# Patient Record
Sex: FEMALE | Race: WHITE | NOT HISPANIC OR LATINO | Employment: FULL TIME | URBAN - METROPOLITAN AREA
[De-identification: names, ages, dates, MRNs, and addresses within clinical notes are randomized per-mention and may not be internally consistent; named-entity substitution may affect disease eponyms.]

---

## 2017-02-17 ENCOUNTER — OFFICE VISIT (OUTPATIENT)
Dept: OPHTHALMOLOGY | Facility: CLINIC | Age: 42
End: 2017-02-17

## 2017-02-17 DIAGNOSIS — H52.13 MYOPIA, BILATERAL: Primary | ICD-10-CM

## 2017-02-17 ASSESSMENT — REFRACTION_CURRENTRX
OD_BRAND: DAILIES TOTAL 1
OS_BASECURVE: 8.5
OD_BASECURVE: 8.5
OS_BRAND: DAILIES TOTAL 1
OD_SPHERE: -1.25
OD_DIAMETER: 14.1
OS_BASECURVE: 8.5
OS_DIAMETER: 14.1
OS_SPHERE: -1.25
OS_BRAND: DAILIES TOTAL 1
OS_SPHERE: -1.25
OS_DIAMETER: 14.1

## 2017-02-17 ASSESSMENT — REFRACTION_WEARINGRX
OS_AXIS: 107
OD_AXIS: 085
OS_SPHERE: -1.75
OS_CYLINDER: +0.75
OD_CYLINDER: +0.50
OD_SPHERE: -1.50

## 2017-02-17 ASSESSMENT — EXTERNAL EXAM - LEFT EYE: OS_EXAM: NORMAL

## 2017-02-17 ASSESSMENT — CONF VISUAL FIELD
METHOD: COUNTING FINGERS
OD_NORMAL: 1
OS_NORMAL: 1

## 2017-02-17 ASSESSMENT — TONOMETRY
OD_IOP_MMHG: 10
OS_IOP_MMHG: 10
IOP_METHOD: ICARE

## 2017-02-17 ASSESSMENT — VISUAL ACUITY
CORRECTION_TYPE: GLASSES
OS_CC: J1+
OD_CC: J1+
METHOD: SNELLEN - LINEAR
OS_CC: 20/20
OD_CC: 20/20
METHOD_MR: LEFT EYE DOMINANT

## 2017-02-17 ASSESSMENT — REFRACTION_MANIFEST
OD_ADD: +1.25
OD_AXIS: 094
OD_CYLINDER: +0.25
OS_AXIS: 113
OS_SPHERE: -1.25
OD_SPHERE: -1.25
OS_CYLINDER: +0.75
OS_ADD: +1.25

## 2017-02-17 ASSESSMENT — EXTERNAL EXAM - RIGHT EYE: OD_EXAM: NORMAL

## 2017-02-17 ASSESSMENT — CUP TO DISC RATIO
OD_RATIO: 0.3
OS_RATIO: 0.25

## 2017-02-17 ASSESSMENT — SLIT LAMP EXAM - LIDS
COMMENTS: NORMAL
COMMENTS: NORMAL

## 2017-02-17 NOTE — NURSING NOTE
Chief Complaints and History of Present Illnesses   Patient presents with     Annual Eye Exam     Contact Lens Evaluation     aware of fee     HPI    Affected eye(s):  Both   Symptoms:     No blurred vision   No decreased vision      Frequency:  Constant       Do you have eye pain now?:  No      Comments:  Pt states glasses work well. Pt states unhappy with contacts because noticing the near vision is getting really difficult. No pain. Visine PRN OU.    Aristides Meneses COT 8:02 AM February 17, 2017

## 2017-02-17 NOTE — MR AVS SNAPSHOT
After Visit Summary   2/17/2017    Temi Truong    MRN: [id number]           Patient Information     Date Of Birth          1975        Visit Information        Provider Department      2/17/2017 8:00 AM Terence Bass OD [facility]        Today's Diagnoses     Myopia, bilateral    -  1       Follow-ups after your visit        Follow-up notes from your care team     Return in about 1 week (around 2/24/2017).      Your next 10 appointments already scheduled     Feb 28, 2017  3:40 PM CST   (Arrive by 3:25 PM)   RETURN GENERAL [facility] ([facility] [place])    [address]  [address] [facility] [address] [phone]              Who to contact     Please call your clinic at [phone] to:    Ask questions about your health    Make or cancel appointments    Discuss your medicines    Learn about your test results    Speak to your doctor   If you have compliments or concerns about an experience at your clinic, or if you wish to file a complaint, please contact [facility] Patient Relations at [phone] or email us at [email or web address]         Additional Information About Your Visit        MyChart Information     Smart Ecosystem[employer] gives you secure access to your electronic health record. If you see a primary care provider, you can also send messages to your care team and make appointments. If you have questions, please call your primary care clinic.  If you do not have a primary care provider, please call [phone] and they will assist you.      AppNexus is an electronic gateway that provides easy, online access to your medical records. With App[employer]ex[employer], you can request a clinic appointment, read your test results, renew a prescription or communicate with your care team.     To access your existing account, please contact your [facility] or call  [phone] for assistance.        Care EveryWhere ID     This is your Care EveryWhere ID. This could be used by other organizations to access your [place] medical records  [id number]         Blood Pressure from Last 3 Encounters:   10/31/16 116/76    Weight from Last 3 Encounters:   10/31/16 81.2 kg (179 lb)              We Performed the Following     HC CONTACT LENS FITTING COSMETIC LVL 2 - PEDS (27299.012)     Refraction        Primary Care Provider Office Phone # Fax #    Zenaida Harriett Cooper [phone]       [facility] [address]  [facility] [address]        Thank you!     Thank you for choosing [facility] OPHTHALMOLOGY  for your care. Our goal is always to provide you with excellent care. Hearing back from our patients is one way we can continue to improve our services. Please take a few minutes to complete the written survey that you may receive in the mail after your visit with us. Thank you!             Your Updated Medication List - Protect others around you: Learn how to safely use, store and throw away your medicines at www.disposemymeds.org.          This list is accurate as of: 2/17/17 11:49 AM.  Always use your most recent med list.                   Brand Name Dispense Instructions for use    levothyroxine 137 MCG tablet    SYNTHROID/LEVOTHROID    90 tablet    Take 1 tablet (137 mcg) by mouth daily

## 2017-02-17 NOTE — PROGRESS NOTES
Assessment/Plan  (H52.13) Myopia, bilateral  (primary encounter diagnosis)  Comment: Compound myopic astigmatism with presbyopia OU  Plan: HC CONTACT LENS FITTING COSMETIC LVL 2 - PEDS         (50409.012), Refraction        Educated patient on condition and clinical findings. Dispensed spectacle prescription for full time wear. Educated patient on possibility of adaptation period, if symptoms do not improve return to clinic for further testing. Dispensed trial lenses OS for monovision correction. Patient to return in 1-2 weeks for contact lens follow-up and dilation.    Contact Lens Billing  V-Code:  - Soft spherical     CL fitting fee: $100 (Patient to be billed).    These are for cosmetic contact lenses.    Encounter Diagnosis   Name Primary?     Myopia, bilateral Yes        Date of last eye exam: 2/17/17        Complete documentation of historical and exam elements from today's encounter can  be found in the full encounter summary report (not reduplicated in this progress  note). I personally obtained the chief complaint(s) and history of present illness. I  confirmed and edited as necessary the review of systems, past medical/surgical  history, family history, social history, and examination findings as documented by  others; and I examined the patient myself. I personally reviewed the relevant tests,  images, and reports as documented above. I formulated and edited as necessary the  assessment and plan and discussed the findings and management plan with the  patient and family.    Terence Bass OD, FAAO

## 2017-02-28 ENCOUNTER — OFFICE VISIT (OUTPATIENT)
Dept: OPHTHALMOLOGY | Facility: CLINIC | Age: 42
End: 2017-02-28

## 2017-02-28 DIAGNOSIS — H52.13 MYOPIA, BILATERAL: Primary | ICD-10-CM

## 2017-02-28 ASSESSMENT — CUP TO DISC RATIO
OS_RATIO: 0.25
OD_RATIO: 0.3

## 2017-02-28 ASSESSMENT — REFRACTION_CURRENTRX
OD_DIAMETER: 14.2
OD_BRAND: ALCON
OS_SPHERE: -1.00
OS_BRAND: ALCON
OS_SPHERE: PLANO
OS_ADD: LOW
OD_SPHERE: -1.25
OD_BASECURVE: 8.6
OS_BRAND: ALCON
OS_BASECURVE: 8.6
OD_ADD: LOW
OS_BASECURVE: 8.6
OS_DIAMETER: 14.2
OS_ADD: LOW
OS_DIAMETER: 14.2

## 2017-02-28 ASSESSMENT — CONF VISUAL FIELD
OD_NORMAL: 1
METHOD: COUNTING FINGERS
OS_NORMAL: 1

## 2017-02-28 ASSESSMENT — SLIT LAMP EXAM - LIDS
COMMENTS: NORMAL
COMMENTS: NORMAL

## 2017-02-28 ASSESSMENT — VISUAL ACUITY
OS_CC: 20/20
OD_CC: 20/20
CORRECTION_TYPE: GLASSES
METHOD: SNELLEN - LINEAR

## 2017-02-28 ASSESSMENT — EXTERNAL EXAM - LEFT EYE: OS_EXAM: NORMAL

## 2017-02-28 ASSESSMENT — TONOMETRY
IOP_METHOD: ICARE
OS_IOP_MMHG: 11
OD_IOP_MMHG: 10

## 2017-02-28 ASSESSMENT — EXTERNAL EXAM - RIGHT EYE: OD_EXAM: NORMAL

## 2017-02-28 NOTE — NURSING NOTE
Chief Complaints and History of Present Illnesses   Patient presents with     Follow Up For     CL recheck and dilation     HPI    Affected eye(s):  Both   Symptoms:     No blurred vision      Frequency:  Constant       Do you have eye pain now?:  No      Comments:  Patient not wearing contacts today- unable to adjust to monovision lenses. Unable to read or do computer work. Pt would like something that would work for both distance and near, especially able to use it for work.     Aristides LIRA 3:47 PM February 28, 2017

## 2017-02-28 NOTE — PROGRESS NOTES
Assessment/Plan  (H52.13) Myopia, bilateral  (primary encounter diagnosis)  Comment: First time multifocal contact lens wearer  Plan: Educated patient on clinical findings. Dispensed multifocal trial lenses. Left lens had significant over-refraction in office, recheck at follow-up. Patient reported significantly improved vision OU compared to monovision. Return to clinic in 2 weeks for follow-up examination.     Ocular health otherwise unremarkable.    This visit billed as no charge contact lens follow-up.    Complete documentation of historical and exam elements from today's encounter can  be found in the full encounter summary report (not reduplicated in this progress  note). I personally obtained the chief complaint(s) and history of present illness. I  confirmed and edited as necessary the review of systems, past medical/surgical  history, family history, social history, and examination findings as documented by  others; and I examined the patient myself. I personally reviewed the relevant tests,  images, and reports as documented above. I formulated and edited as necessary the  assessment and plan and discussed the findings and management plan with the  patient and family.    Terence Bass, RAVEN, FAAO

## 2017-02-28 NOTE — MR AVS SNAPSHOT
After Visit Summary   2/28/2017    Temi Truong    MRN: [id number]           Patient Information     Date Of Birth          1975        Visit Information        Provider Department      2/28/2017 3:40 PM Terence Bass, RAVEN [facility]        Today's Diagnoses     Myopia, bilateral    -  1       Follow-ups after your visit        Follow-up notes from your care team     Return in about 2 weeks (around 3/14/2017) for Follow Up.      Who to contact     Please call your clinic at [phone] to:    Ask questions about your health    Make or cancel appointments    Discuss your medicines    Learn about your test results    Speak to your doctor   If you have compliments or concerns about an experience at your clinic, or if you wish to file a complaint, please contact [facility] Patient Relations at [phone] or email us at [email or web address]         Additional Information About Your Visit        MyChart Information     SCI Solutiont gives you secure access to your electronic health record. If you see a primary care provider, you can also send messages to your care team and make appointments. If you have questions, please call your primary care clinic.  If you do not have a primary care provider, please call [phone] and they will assist you.      [employer] Exchange is an electronic gateway that provides easy, online access to your medical records. With [employer], you can request a clinic appointment, read your test results, renew a prescription or communicate with your care team.     To access your existing account, please contact your [facility] Clinic or call [phone] for assistance.        Care EveryWhere ID     This is your Care EveryWhere ID. This could be used by other organizations to access your [place] medical records  [id number]         Blood Pressure from Last 3 Encounters:   10/31/16 116/76    Weight from Last 3  Encounters:   10/31/16 81.2 kg (179 lb)              Today, you had the following     No orders found for display       Primary Care Provider Office Phone # Fax #    Zenaida Harriett Cooper [phone]       [address]  [facility] [address]        Thank you!     Thank you for choosing [facility] OPHTHALMOLOGY  for your care. Our goal is always to provide you with excellent care. Hearing back from our patients is one way we can continue to improve our services. Please take a few minutes to complete the written survey that you may receive in the mail after your visit with us. Thank you!             Your Updated Medication List - Protect others around you: Learn how to safely use, store and throw away your medicines at www.disposemymeds.org.          This list is accurate as of: 2/28/17 11:59 PM.  Always use your most recent med list.                   Brand Name Dispense Instructions for use    levothyroxine 137 MCG tablet    SYNTHROID/LEVOTHROID    90 tablet    Take 1 tablet (137 mcg) by mouth daily

## 2017-03-27 ENCOUNTER — MYC MEDICAL ADVICE (OUTPATIENT)
Dept: INTERNAL MEDICINE | Facility: CLINIC | Age: 42
End: 2017-03-27

## 2017-03-27 DIAGNOSIS — L65.9 HAIR LOSS: ICD-10-CM

## 2017-03-27 DIAGNOSIS — L60.8 CHANGE IN NAIL APPEARANCE: Primary | ICD-10-CM

## 2017-03-29 DIAGNOSIS — L65.9 HAIR LOSS: ICD-10-CM

## 2017-03-29 DIAGNOSIS — L60.8 CHANGE IN NAIL APPEARANCE: ICD-10-CM

## 2017-03-29 LAB
DEPRECATED CALCIDIOL+CALCIFEROL SERPL-MC: 22 UG/L (ref 20–75)
FERRITIN SERPL-MCNC: 47 NG/ML (ref 12–150)
TSH SERPL DL<=0.005 MIU/L-ACNC: 3.61 MU/L (ref 0.4–4)

## 2017-04-11 ENCOUNTER — OFFICE VISIT (OUTPATIENT)
Dept: OPHTHALMOLOGY | Facility: CLINIC | Age: 42
End: 2017-04-11

## 2017-04-11 DIAGNOSIS — H52.13 MYOPIA, BILATERAL: Primary | ICD-10-CM

## 2017-04-11 ASSESSMENT — REFRACTION_CURRENTRX
OD_BASECURVE: 8.6
OD_SPHERE: -1.25
OS_DIAMETER: 14.2
OD_DIAMETER: 14.2
OD_BRAND: ALCON
OD_ADD: LOW
OS_SPHERE: -1.00
OS_BRAND: ALCON
OD_BRAND: ALCON
OD_BASECURVE: 8.6
OS_ADD: LOW
OD_DIAMETER: 14.2
OS_BRAND: ALCON
OS_SPHERE: PLANO
OS_ADD: LOW
OD_SPHERE: -1.25
OS_BASECURVE: 8.6
OD_ADD: LOW
OS_BASECURVE: 8.6
OS_DIAMETER: 14.2

## 2017-04-11 ASSESSMENT — VISUAL ACUITY
OS_CC: 20/50
CORRECTION_TYPE: CONTACTS
OS_CC: J1+OU
OD_CC: 20/20
METHOD: SNELLEN - LINEAR
OD_CC: J1+OU

## 2017-04-11 ASSESSMENT — TONOMETRY
IOP_METHOD: ICARE
OS_IOP_MMHG: 12
OD_IOP_MMHG: 12

## 2017-04-11 NOTE — NURSING NOTE
Chief Complaints and History of Present Illnesses   Patient presents with     Contact Lens Follow Up     HPI    Affected eye(s):  Both   Symptoms:     No blurred vision      Frequency:  Constant       Do you have eye pain now?:  No      Comments:  Pt states contacts are ok. Near vision is great. Distance vision is not perfect but doable. Pt states able to wear contacts all day and able to see computer and close up work- so happy with contacts.    Aristides LIRA 4:09 PM April 11, 2017

## 2017-04-11 NOTE — MR AVS SNAPSHOT
After Visit Summary   4/11/2017    Temi Truong    MRN: [id number]           Patient Information     Date Of Birth          1975        Visit Information        Provider Department      4/11/2017 4:00 PM Terence Bass OD [facility]         Follow-ups after your visit        Your next 10 appointments already scheduled     Apr 25, 2017  3:40 PM CDT   (Arrive by 3:25 PM)   RETURN GENERAL with RAVEN [facility] ([facility] [place])    [address] [place] [address] [phone]              Who to contact     Please call your clinic at [phone] to:    Ask questions about your health    Make or cancel appointments    Discuss your medicines    Learn about your test results    Speak to your doctor   If you have compliments or concerns about an experience at your clinic, or if you wish to file a complaint, please contact [facility] Patient Relations at [phone] or email us at [email or web address]         Additional Information About Your Visit        MyChart Information     [employer] gives you secure access to your electronic health record. If you see a primary care provider, you can also send messages to your care team and make appointments. If you have questions, please call your primary care clinic.  If you do not have a primary care provider, please call [phone] and they will assist you.      [employer] Systems is an electronic gateway that provides easy, online access to your medical records. With [employer] Systems, you can request a clinic appointment, read your test results, renew a prescription or communicate with your care team.     To access your existing account, please contact your [facility] or call [phone] for assistance.        Care EveryWhere ID     This is your Care EveryWhere ID. This could be used by other organizations to access your  [place] medical records  [id number]         Blood Pressure from Last 3 Encounters:   10/31/16 116/76    Weight from Last 3 Encounters:   10/31/16 81.2 kg (179 lb)              Today, you had the following     No orders found for display       Primary Care Provider Office Phone # Fax #    Zenaida Harriett Cooper [phone]       [address]  [facility] [address]        Thank you!     Thank you for choosing [facility] OPHTHALMOLOGY  for your care. Our goal is always to provide you with excellent care. Hearing back from our patients is one way we can continue to improve our services. Please take a few minutes to complete the written survey that you may receive in the mail after your visit with us. Thank you!             Your Updated Medication List - Protect others around you: Learn how to safely use, store and throw away your medicines at www.disposemymeds.org.          This list is accurate as of: 4/11/17  5:13 PM.  Always use your most recent med list.                   Brand Name Dispense Instructions for use    levothyroxine 137 MCG tablet    SYNTHROID/LEVOTHROID    90 tablet    Take 1 tablet (137 mcg) by mouth daily

## 2017-04-11 NOTE — PROGRESS NOTES
rtc in 2 weeks for CL follow-up    340 on Tuesday    Assessment/Plan  (H52.13) Myopia, bilateral  (primary encounter diagnosis)  Comment: Reports improved vision in current contact lenses  Plan:  Dispensed trial contact lenses (Rx2) based on over-refraction. Return to clinic in 2 weeks to verify improved vision in updated prescription.    This visit billed as a no-charge contact lens follow-up.      Complete documentation of historical and exam elements from today's encounter can  be found in the full encounter summary report (not reduplicated in this progress  note). I personally obtained the chief complaint(s) and history of present illness. I  confirmed and edited as necessary the review of systems, past medical/surgical  history, family history, social history, and examination findings as documented by  others; and I examined the patient myself. I personally reviewed the relevant tests,  images, and reports as documented above. I formulated and edited as necessary the  assessment and plan and discussed the findings and management plan with the  patient and family.    Terence Bass OD, FAAO

## 2017-05-02 ENCOUNTER — OFFICE VISIT (OUTPATIENT)
Dept: OPHTHALMOLOGY | Facility: CLINIC | Age: 42
End: 2017-05-02

## 2017-05-02 DIAGNOSIS — H52.13 MYOPIA, BILATERAL: Primary | ICD-10-CM

## 2017-05-02 ASSESSMENT — REFRACTION_CURRENTRX
OS_BASECURVE: 8.6
OS_SPHERE: -1.00
OD_BRAND: ALCON
OD_SPHERE: -1.25
OS_ADD: LOW
OD_BASECURVE: 8.6
OD_ADD: LOW
OD_DIAMETER: 14.2
OS_BRAND: ALCON
OS_DIAMETER: 14.2

## 2017-05-02 ASSESSMENT — EXTERNAL EXAM - LEFT EYE: OS_EXAM: NORMAL

## 2017-05-02 ASSESSMENT — EXTERNAL EXAM - RIGHT EYE: OD_EXAM: NORMAL

## 2017-05-02 ASSESSMENT — VISUAL ACUITY
OD_CC: 20/20
OS_CC: 20/40
OS_CC+: +2
CORRECTION_TYPE: CONTACTS
METHOD: SNELLEN - LINEAR

## 2017-05-02 ASSESSMENT — SLIT LAMP EXAM - LIDS
COMMENTS: NORMAL
COMMENTS: NORMAL

## 2017-05-02 NOTE — MR AVS SNAPSHOT
After Visit Summary   5/2/2017    Temi Truong    MRN: [id number]           Patient Information     Date Of Birth          1975        Visit Information        Provider Department      5/2/2017 9:00 AM Terence Bass, RAVEN [facility]        Today's Diagnoses     Myopia, bilateral    -  1       Follow-ups after your visit        Follow-up notes from your care team     Return in about 1 year (around 5/2/2018) for Annual Visit.      Who to contact     Please call your clinic at [phone] to:    Ask questions about your health    Make or cancel appointments    Discuss your medicines    Learn about your test results    Speak to your doctor   If you have compliments or concerns about an experience at your clinic, or if you wish to file a complaint, please contact [facility] Patient Relations at [phone] or email us at [email or web address]         Additional Information About Your Visit        MyChart Information     Informanton[employer]et gives you secure access to your electronic health record. If you see a primary care provider, you can also send messages to your care team and make appointments. If you have questions, please call your primary care clinic.  If you do not have a primary care provider, please call [phone] and they will assist you.      Phyzios is an electronic gateway that provides easy, online access to your medical records. With Phyzios, you can request a clinic appointment, read your test results, renew a prescription or communicate with your care team.     To access your existing account, please contact your [facility] or call [phone] for assistance.        Care EveryWhere ID     This is your Care EveryWhere ID. This could be used by other organizations to access your [place] medical records  [id number]         Blood Pressure from Last 3 Encounters:   10/31/16 116/76    Weight from Last 3  Encounters:   10/31/16 81.2 kg (179 lb)              Today, you had the following     No orders found for display       Primary Care Provider Office Phone # Fax #    Zenaida Harriett Cooper [phone]       [address]  [facility] [address]        Thank you!     Thank you for choosing [facility] OPHTHALMOLOGY  for your care. Our goal is always to provide you with excellent care. Hearing back from our patients is one way we can continue to improve our services. Please take a few minutes to complete the written survey that you may receive in the mail after your visit with us. Thank you!             Your Updated Medication List - Protect others around you: Learn how to safely use, store and throw away your medicines at www.disposemymeds.org.          This list is accurate as of: 5/2/17  9:22 AM.  Always use your most recent med list.                   Brand Name Dispense Instructions for use    levothyroxine 137 MCG tablet    SYNTHROID/LEVOTHROID    90 tablet    Take 1 tablet (137 mcg) by mouth daily

## 2017-05-02 NOTE — NURSING NOTE
Chief Complaints and History of Present Illnesses   Patient presents with     Contact Lens Follow Up     HPI    Affected eye(s):  Both   Symptoms:     No blurred vision   No difficulty with reading      Frequency:  Constant       Do you have eye pain now?:  No      Comments:  Comfort of contacts are good, Vision at distance isnt perfect but has not problems with computer or reading distance.    Aristides LIRA 9:07 AM May 2, 2017

## 2017-05-02 NOTE — PROGRESS NOTES
Assessment/Plan  (H52.13) Myopia, bilateral  (primary encounter diagnosis)  Comment: Excellent binocular vision, decreased vision OS likely due to uncorrected astigmatism and multifocal optics  Plan:  Educated patient on clinical findings. Dispensed finalized contact lens prescription. Monitor annually.      Complete documentation of historical and exam elements from today's encounter can  be found in the full encounter summary report (not reduplicated in this progress  note). I personally obtained the chief complaint(s) and history of present illness. I  confirmed and edited as necessary the review of systems, past medical/surgical  history, family history, social history, and examination findings as documented by  others; and I examined the patient myself. I personally reviewed the relevant tests,  images, and reports as documented above. I formulated and edited as necessary the  assessment and plan and discussed the findings and management plan with the  patient and family.    Terence Bass, RAVEN, FAAO

## 2018-10-01 ENCOUNTER — HEALTH MAINTENANCE LETTER (OUTPATIENT)
Age: 43
End: 2018-10-01

## 2020-02-23 ENCOUNTER — HEALTH MAINTENANCE LETTER (OUTPATIENT)
Age: 45
End: 2020-02-23

## 2020-12-13 ENCOUNTER — HEALTH MAINTENANCE LETTER (OUTPATIENT)
Age: 45
End: 2020-12-13

## 2021-01-27 ENCOUNTER — IMMUNIZATION (OUTPATIENT)
Dept: PEDIATRICS | Facility: CLINIC | Age: 46
End: 2021-01-27
Payer: COMMERCIAL

## 2021-01-27 PROCEDURE — 0001A PR COVID VAC PFIZER DIL RECON 30 MCG/0.3 ML IM: CPT

## 2021-01-27 PROCEDURE — 91300 PR COVID VAC PFIZER DIL RECON 30 MCG/0.3 ML IM: CPT

## 2021-02-17 ENCOUNTER — IMMUNIZATION (OUTPATIENT)
Dept: PEDIATRICS | Facility: CLINIC | Age: 46
End: 2021-02-17
Attending: INTERNAL MEDICINE
Payer: COMMERCIAL

## 2021-02-17 PROCEDURE — 0002A PR COVID VAC PFIZER DIL RECON 30 MCG/0.3 ML IM: CPT

## 2021-02-17 PROCEDURE — 91300 PR COVID VAC PFIZER DIL RECON 30 MCG/0.3 ML IM: CPT

## 2021-02-21 ENCOUNTER — HEALTH MAINTENANCE LETTER (OUTPATIENT)
Age: 46
End: 2021-02-21

## 2021-04-17 ENCOUNTER — HEALTH MAINTENANCE LETTER (OUTPATIENT)
Age: 46
End: 2021-04-17

## 2021-09-26 ENCOUNTER — HEALTH MAINTENANCE LETTER (OUTPATIENT)
Age: 46
End: 2021-09-26

## 2022-03-13 ENCOUNTER — HEALTH MAINTENANCE LETTER (OUTPATIENT)
Age: 47
End: 2022-03-13

## 2022-05-08 ENCOUNTER — HEALTH MAINTENANCE LETTER (OUTPATIENT)
Age: 47
End: 2022-05-08

## 2023-01-08 ENCOUNTER — HEALTH MAINTENANCE LETTER (OUTPATIENT)
Age: 48
End: 2023-01-08

## 2023-04-23 ENCOUNTER — HEALTH MAINTENANCE LETTER (OUTPATIENT)
Age: 48
End: 2023-04-23

## 2023-06-02 ENCOUNTER — HEALTH MAINTENANCE LETTER (OUTPATIENT)
Age: 48
End: 2023-06-02